# Patient Record
Sex: FEMALE | Race: WHITE
[De-identification: names, ages, dates, MRNs, and addresses within clinical notes are randomized per-mention and may not be internally consistent; named-entity substitution may affect disease eponyms.]

---

## 2023-06-08 ENCOUNTER — HOSPITAL ENCOUNTER (OUTPATIENT)
Dept: HOSPITAL 95 - LAB | Age: 23
Discharge: HOME | End: 2023-06-08
Attending: ADVANCED PRACTICE MIDWIFE
Payer: COMMERCIAL

## 2023-06-08 DIAGNOSIS — Z34.03: Primary | ICD-10-CM

## 2023-06-29 ENCOUNTER — HOSPITAL ENCOUNTER (OUTPATIENT)
Dept: HOSPITAL 95 - OBS | Age: 23
Setting detail: OBSERVATION
LOS: 1 days | Discharge: HOME | End: 2023-06-30
Attending: ADVANCED PRACTICE MIDWIFE | Admitting: ADVANCED PRACTICE MIDWIFE
Payer: COMMERCIAL

## 2023-06-29 VITALS — BODY MASS INDEX: 34.85 KG/M2 | WEIGHT: 189.38 LBS | HEIGHT: 62 IN

## 2023-06-29 VITALS — SYSTOLIC BLOOD PRESSURE: 113 MMHG | DIASTOLIC BLOOD PRESSURE: 72 MMHG

## 2023-06-29 DIAGNOSIS — F32.9: ICD-10-CM

## 2023-06-29 DIAGNOSIS — Z3A.39: ICD-10-CM

## 2023-06-29 DIAGNOSIS — F41.9: ICD-10-CM

## 2023-06-29 DIAGNOSIS — O99.341: ICD-10-CM

## 2023-06-29 DIAGNOSIS — O36.8330: Primary | ICD-10-CM

## 2023-06-29 PROCEDURE — A9270 NON-COVERED ITEM OR SERVICE: HCPCS

## 2023-06-29 PROCEDURE — G0378 HOSPITAL OBSERVATION PER HR: HCPCS

## 2023-06-30 VITALS — SYSTOLIC BLOOD PRESSURE: 100 MMHG | DIASTOLIC BLOOD PRESSURE: 63 MMHG

## 2023-06-30 VITALS — DIASTOLIC BLOOD PRESSURE: 53 MMHG | SYSTOLIC BLOOD PRESSURE: 101 MMHG

## 2023-06-30 VITALS — SYSTOLIC BLOOD PRESSURE: 95 MMHG | DIASTOLIC BLOOD PRESSURE: 57 MMHG

## 2023-06-30 VITALS — DIASTOLIC BLOOD PRESSURE: 56 MMHG | SYSTOLIC BLOOD PRESSURE: 110 MMHG

## 2023-06-30 NOTE — NUR
0730 ASSUMED CARE OF PATIENT RESTING AT PRESENT. DISCUSSED WITH PATIENT AND
S/O THAT THIS IS A NON SMOKING FACILITY, NO SMOKING INDOORS AND DON'T BRING
LIGHTERS OR MATCHES OR OTHER IGNITION SOURCES ONTO UNIT. PATIENT AND S/O
VERBALIZE UNDERSTANDING

## 2023-10-19 ENCOUNTER — HOSPITAL ENCOUNTER (OUTPATIENT)
Dept: HOSPITAL 95 - ORSCSDS | Age: 23
Discharge: HOME | End: 2023-10-19
Attending: OBSTETRICS & GYNECOLOGY
Payer: COMMERCIAL

## 2023-10-19 VITALS — WEIGHT: 144.18 LBS | BODY MASS INDEX: 27.22 KG/M2 | HEIGHT: 61 IN

## 2023-10-19 VITALS — SYSTOLIC BLOOD PRESSURE: 102 MMHG | DIASTOLIC BLOOD PRESSURE: 65 MMHG

## 2023-10-19 DIAGNOSIS — Z30.2: Primary | ICD-10-CM

## 2023-10-19 DIAGNOSIS — N83.8: ICD-10-CM

## 2023-10-19 PROCEDURE — 0UT74ZZ RESECTION OF BILATERAL FALLOPIAN TUBES, PERCUTANEOUS ENDOSCOPIC APPROACH: ICD-10-PCS | Performed by: OBSTETRICS & GYNECOLOGY
